# Patient Record
Sex: FEMALE | Race: OTHER | NOT HISPANIC OR LATINO | URBAN - METROPOLITAN AREA
[De-identification: names, ages, dates, MRNs, and addresses within clinical notes are randomized per-mention and may not be internally consistent; named-entity substitution may affect disease eponyms.]

---

## 2018-01-04 ENCOUNTER — EMERGENCY (EMERGENCY)
Facility: HOSPITAL | Age: 43
LOS: 1 days | Discharge: HOME | End: 2018-01-04
Attending: EMERGENCY MEDICINE | Admitting: EMERGENCY MEDICINE
Payer: COMMERCIAL

## 2018-01-04 VITALS
HEIGHT: 69 IN | SYSTOLIC BLOOD PRESSURE: 141 MMHG | DIASTOLIC BLOOD PRESSURE: 64 MMHG | TEMPERATURE: 98 F | OXYGEN SATURATION: 98 % | RESPIRATION RATE: 18 BRPM | WEIGHT: 139.99 LBS | HEART RATE: 74 BPM

## 2018-01-04 DIAGNOSIS — Z88.2 ALLERGY STATUS TO SULFONAMIDES: ICD-10-CM

## 2018-01-04 DIAGNOSIS — K02.9 DENTAL CARIES, UNSPECIFIED: ICD-10-CM

## 2018-01-04 DIAGNOSIS — R68.84 JAW PAIN: ICD-10-CM

## 2018-01-04 PROCEDURE — 99283 EMERGENCY DEPT VISIT LOW MDM: CPT

## 2018-01-04 PROCEDURE — 99283 EMERGENCY DEPT VISIT LOW MDM: CPT | Mod: 25

## 2018-01-04 RX ORDER — PENICILLIN V POTASSIUM 250 MG
1 TABLET ORAL
Qty: 40 | Refills: 0 | OUTPATIENT
Start: 2018-01-04 | End: 2018-01-13

## 2018-01-04 RX ORDER — PENICILLIN V POTASSIUM 250 MG
500 TABLET ORAL ONCE
Qty: 0 | Refills: 0 | Status: COMPLETED | OUTPATIENT
Start: 2018-01-04 | End: 2018-01-04

## 2018-01-04 RX ADMIN — Medication 500 MILLIGRAM(S): at 23:36

## 2018-01-04 NOTE — ED PROVIDER NOTE - OBJECTIVE STATEMENT
42 year old female presents to ED with chief complaint right sided upper jaw pain, ongoing for apx 8 months.  Patient states she developed what she believes was a small abscess that has been draining for her over the past few days.  She denies associated fever, chills, trismus, malocclusion, submandibular pain or any additional acute complaints/concerns at this time.  She notes she does have a dentist follow up appointment tomorrow, however she would like a prescription for antibiotics prior to this appointment.  She has been taking motrin for her discomfort with some improvement in her discomfort.

## 2018-01-04 NOTE — ED ADULT NURSE NOTE - MODE OF DISCHARGE
C/o mild headache, requesting motrin. md updated.   
Dr. West at bedside for reassessment and to discuss test results and discharge instructions.  
Pt sitting up on stretcher, NAD, VSS, no c/o pain at current time. Family at bedside, pt and family updated on plan of care. Will continue to monitor.   
Ambulatory

## 2018-01-04 NOTE — ED PROVIDER NOTE - MEDICAL DECISION MAKING DETAILS
Patient in ED with concern for dental caries causing discomfort.  Non toxic in appearance.  No periapical abscess is identified.  No trismus, submandibular pain/tenderness, or additional concerning signs for migration of infection.  Will provide rx for abx and advise continued use of motrin with dental follow up tomorrow as previously planned.  Patient aware and agreeable to plan.  She is advised to return to ED should symptoms worsen or if she has any concern prior to this follow up.  Patient verbalizes her understanding of plan and is ready for discharge.

## 2018-01-04 NOTE — ED PROVIDER NOTE - ENMT NEGATIVE STATEMENT, MLM
Ears: no ear pain and no hearing problems.Nose: no nasal congestion and no nasal drainage.Mouth/Throat: No drooling, + right upper jaw pain, no dysphagia, no hoarseness and no throat pain.Neck: no lumps, no pain, no stiffness and no swollen glands.

## 2018-01-04 NOTE — ED ADULT TRIAGE NOTE - ARRIVAL INFO ADDITIONAL COMMENTS
pt c.o "irritation under right eye and right ear". states she has a "tooth infection for one month and has a dentist appointment tomorrow". denies any pain, fever/chills.

## 2018-01-04 NOTE — ED PROVIDER NOTE - EYES, MLM
Clear bilaterally, pupils equal, round and reactive to light.  No proptosis, no pain with extraocular eye movement

## 2020-05-26 NOTE — ED PROVIDER NOTE - TOBACCO USE
Is This A New Presentation, Or A Follow-Up?: Skin Lesion What Type Of Note Output Would You Prefer (Optional)?: Bullet Format How Severe Is Your Skin Lesion?: mild Has Your Skin Lesion Been Treated?: not been treated Additional History: Suspects wart. Unknown if ever smoked

## 2024-06-27 NOTE — ED PROVIDER NOTE - DISCUSSED CLINICAL AND RADIOLOGICAL FINDINGS WITH, MDM
Health Maintenance       Diabetes Foot Exam (Yearly)  Overdue since 10/19/2022    Abdominal Aortic Aneurysm (AAA) Screening (Once)  Ordered on 6/27/2024    COVID-19 Vaccine (4 - 2023-24 season)  Overdue since 9/1/2023    Traditional Medicare- Medicare Wellness Visit (Yearly)  Due since 6/1/2024    Diabetes Eye Exam (Yearly)  Due since 6/6/2024           Following review of the above:  Patient is not proceeding with: Abdominal Aortic Aneurysm (AAA) screening, Diabetes Eye Exam, and COVID-19    Note: Refer to final orders and clinician documentation.         patient